# Patient Record
Sex: MALE | Race: BLACK OR AFRICAN AMERICAN | Employment: PART TIME | URBAN - METROPOLITAN AREA
[De-identification: names, ages, dates, MRNs, and addresses within clinical notes are randomized per-mention and may not be internally consistent; named-entity substitution may affect disease eponyms.]

---
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
## 2022-02-13 ENCOUNTER — HOSPITAL ENCOUNTER (EMERGENCY)
Facility: HOSPITAL | Age: 22
Discharge: HOME | End: 2022-02-13
Attending: EMERGENCY MEDICINE
Payer: COMMERCIAL

## 2022-02-13 VITALS
DIASTOLIC BLOOD PRESSURE: 82 MMHG | SYSTOLIC BLOOD PRESSURE: 130 MMHG | WEIGHT: 140 LBS | HEART RATE: 89 BPM | TEMPERATURE: 98 F | OXYGEN SATURATION: 100 % | RESPIRATION RATE: 16 BRPM | BODY MASS INDEX: 17.97 KG/M2 | HEIGHT: 74 IN

## 2022-02-13 DIAGNOSIS — S61.412A LACERATION OF LEFT HAND, FOREIGN BODY PRESENCE UNSPECIFIED, INITIAL ENCOUNTER: Primary | ICD-10-CM

## 2022-02-13 PROCEDURE — 12002 RPR S/N/AX/GEN/TRNK2.6-7.5CM: CPT

## 2022-02-13 PROCEDURE — 99283 EMERGENCY DEPT VISIT LOW MDM: CPT

## 2022-02-25 ENCOUNTER — HOSPITAL ENCOUNTER (EMERGENCY)
Facility: HOSPITAL | Age: 22
Discharge: HOME | End: 2022-02-25
Payer: COMMERCIAL

## 2022-02-25 VITALS
BODY MASS INDEX: 17.97 KG/M2 | OXYGEN SATURATION: 99 % | WEIGHT: 140 LBS | HEIGHT: 74 IN | HEART RATE: 93 BPM | RESPIRATION RATE: 18 BRPM | DIASTOLIC BLOOD PRESSURE: 81 MMHG | SYSTOLIC BLOOD PRESSURE: 119 MMHG | TEMPERATURE: 98 F

## 2022-02-25 DIAGNOSIS — Z48.02 ENCOUNTER FOR REMOVAL OF SUTURES: Primary | ICD-10-CM

## 2022-02-25 NOTE — ED QUICK NOTES
Pt to the ed for removal of sutures to left hand. Pt tolerated removal well. Provided with information if follow up is needed. Pt prepared for dc home.

## 2023-06-26 NOTE — ED INITIAL ASSESSMENT (HPI)
Patient to ER from home with left hand laceration after he punched at door at 1530. Patient states he is up to date on TDAP. No

## (undated) NOTE — LETTER
Date & Time: 2/13/2022, 10:48 PM  Patient: Fredis Goldberg  Encounter Provider(s):    John Israel MD       To Whom It May Concern:    Sonia Browne was seen and treated in our department on 2/13/2022. He should not return to work until 2/15/22.     If you have any questions or concerns, please do not hesitate to call.        _____________________________  Physician/APC Signature

## (undated) NOTE — LETTER
Date & Time: 9/22/2018, 1:37 PM  Patient: Regine Mercado  Encounter Provider(s):    JOLANTA Reza       To Whom It May Concern:    Michelle Noriega was seen and treated in our department on 9/22/2018.   He should not return to gym or sports for 1 we

## (undated) NOTE — LETTER
Date & Time: 7/1/2020, 2:57 PM  Patient: Hilario Obando  Encounter Provider(s):    ARMIN Grier       To Whom It May Concern:    Raman January was seen and treated in our department on 7/1/2020.  He can return to work with these limitat